# Patient Record
Sex: MALE | Race: WHITE | NOT HISPANIC OR LATINO | Employment: OTHER | URBAN - NONMETROPOLITAN AREA
[De-identification: names, ages, dates, MRNs, and addresses within clinical notes are randomized per-mention and may not be internally consistent; named-entity substitution may affect disease eponyms.]

---

## 2017-03-06 DIAGNOSIS — R06.00 DYSPNEA, UNSPECIFIED TYPE: Primary | ICD-10-CM

## 2017-04-11 ENCOUNTER — OFFICE VISIT (OUTPATIENT)
Dept: CARDIOLOGY | Facility: CLINIC | Age: 57
End: 2017-04-11

## 2017-04-11 VITALS
RESPIRATION RATE: 18 BRPM | HEIGHT: 73 IN | WEIGHT: 315 LBS | DIASTOLIC BLOOD PRESSURE: 76 MMHG | BODY MASS INDEX: 41.75 KG/M2 | OXYGEN SATURATION: 95 % | SYSTOLIC BLOOD PRESSURE: 130 MMHG | HEART RATE: 69 BPM

## 2017-04-11 DIAGNOSIS — E78.5 HYPERLIPIDEMIA, UNSPECIFIED HYPERLIPIDEMIA TYPE: ICD-10-CM

## 2017-04-11 DIAGNOSIS — I10 ESSENTIAL HYPERTENSION: Primary | ICD-10-CM

## 2017-04-11 PROCEDURE — 99213 OFFICE O/P EST LOW 20 MIN: CPT | Performed by: INTERNAL MEDICINE

## 2017-04-11 RX ORDER — SERTRALINE HYDROCHLORIDE 100 MG/1
100 TABLET, FILM COATED ORAL DAILY
COMMUNITY

## 2017-04-11 NOTE — PROGRESS NOTES
Chief complaint : Follow-up on her nuclear perfusion imaging stress test    History of Present Illness very pleasant 56-year-old gentleman who comes today for a follow up visit and discuss findings of his nuclear perfusion imaging stress test.  Patient currently has no chest pain or chest discomfort.  He is currently suffering from nausea and diarrhea.  No fever or chills.    Subjective      Review of Systems   Cardiovascular: Negative for chest pain and dyspnea on exertion.   Respiratory: Negative for shortness of breath.        Past Medical History:   Diagnosis Date   • Acute bronchitis    • Allergic rhinitis    • Allergic rhinitis due to pollen    • Ankle pain    • Backache    • Cellulitis, face     Cellulitis and abscess of face      • Coronary arteriosclerosis    • Cough    • Depression    • Dysphagia     TO SOLIDS   • Dyspnea     COPD   • Edema of upper extremity    • Epigastric pain    • Essential hypertension    • Exanthematous disorder    • Foot pain    • GERD (gastroesophageal reflux disease)    • History of echocardiogram 06/04/2014    Echocardiogram W/ color flow 91537 (Normal LV systolic function with Ef of 60-65%. Diastolic dysfunction. Mild LVH.)   • Hypercholesterolemia    • Itching     OF SKIN   • Knee pain    • Localized, primary osteoarthritis of ankle or foot    • Morbid obesity    • Multiple closed fractures of metatarsal bone    • Nausea and vomiting    • Neoplasm of uncertain behavior of skin    • Neurologic disorder associated with diabetes mellitus     TYPE 2    • Osteoarthritis of multiple joints    • Stress fracture    • Type 2 diabetes mellitus    • Upper respiratory infection        Family History   Problem Relation Age of Onset   • Diabetes Other    • Heart disease Other        Benazepril and Codeine     reports that he has quit smoking. He does not have any smokeless tobacco history on file. He reports that he does not drink alcohol or use illicit drugs.    Objective     Vital  Signs  Heart Rate:  [69] 69  Resp:  [18] 18  BP: (130)/(76) 130/76    Physical Exam   Constitutional: He is oriented to person, place, and time. He appears well-developed and well-nourished.   HENT:   Head: Normocephalic and atraumatic.   Eyes: Conjunctivae and EOM are normal. Pupils are equal, round, and reactive to light.   Neck: Neck supple. No JVD present. Carotid bruit is not present. No tracheal deviation and no edema present.   Cardiovascular: Normal rate, regular rhythm, S1 normal, S2 normal, normal heart sounds and intact distal pulses.  Exam reveals no gallop, no S3, no S4 and no friction rub.    No murmur heard.  Pulmonary/Chest: Effort normal and breath sounds normal. He has no wheezes. He has no rales. He exhibits no tenderness.   Abdominal: Bowel sounds are normal. He exhibits no abdominal bruit and no pulsatile midline mass. There is no rebound and no guarding.   Musculoskeletal: Normal range of motion. He exhibits no edema.   Neurological: He is alert and oriented to person, place, and time.   Skin: Skin is warm and dry.   Psychiatric: He has a normal mood and affect.       Procedures    Assessment/Plan     Patient Active Problem List   Diagnosis   • Backache   • Essential hypertension   • Morbid obesity with body mass index (BMI) of 40.0 to 44.9 in adult   • Hyperlipidemia   • Pre-op evaluation     1. Essential hypertension  Patient's blood pressure is well-controlled.  Continue with current medications    2. Hyperlipidemia, unspecified hyperlipidemia type  I have advised patient to continue with risk factor modification.  His bariatric surgery also would help with his management of hyperlipidemia and weight loss.  Patient has been cleared for his bariatric surgery.    His nuclear perfusion imaging stress test was essentially normal with no evidence of perfusion defect.  Left ventricular systolic function was within normal limits.    I discussed the patients findings and my recommendations with  patient.    Christy Wolf MD  04/11/17  10:08 AM    EMR Dragon/Transcription disclaimer:   Much of this encounter note is an electronic transcription/translation of spoken language to printed text. The electronic translation of spoken language may permit erroneous, or at times, nonsensical words or phrases to be inadvertently transcribed; Although I have reviewed the note for such errors, some may still exist.